# Patient Record
Sex: MALE | Race: WHITE
[De-identification: names, ages, dates, MRNs, and addresses within clinical notes are randomized per-mention and may not be internally consistent; named-entity substitution may affect disease eponyms.]

---

## 2017-05-18 NOTE — CR
EXAMINATION: Right elbow

 

HISTORY: Pain

 

COMPARISON: None

 

TECHNIQUE: 3 views

 

FINDINGS/IMPRESSION: There is no acute osseous abnormality, dislocation, or fracture identified. Bon
e mineralization and joint spaces appear normal. No soft tissue swelling or joint effusion.

## 2021-06-28 ENCOUNTER — HOSPITAL ENCOUNTER (EMERGENCY)
Dept: HOSPITAL 56 - MW.ED | Age: 51
Discharge: HOME | End: 2021-06-28
Payer: COMMERCIAL

## 2021-06-28 DIAGNOSIS — K64.4: Primary | ICD-10-CM

## 2021-06-28 PROCEDURE — 96372 THER/PROPH/DIAG INJ SC/IM: CPT

## 2021-06-28 PROCEDURE — 99282 EMERGENCY DEPT VISIT SF MDM: CPT

## 2021-06-28 NOTE — EDM.PDOC
ED HPI GENERAL MEDICAL PROBLEM





- General


Chief Complaint: Gastrointestinal Problem


Stated Complaint: HEMORRHOID


Time Seen by Provider: 06/28/21 08:42





- History of Present Illness


INITIAL COMMENTS - FREE TEXT/NARRATIVE: 





HISTORY AND PHYSICAL:





History of present illness:


This is a 51-year-old gentleman with no significant past medical history who 

presents ER today secondary to hemorrhoidal pain that started in the middle the 

night.  Patient reports that yesterday evening while he was taking a shower he 

felt his hemorrhoids popping out.  He reports he was able to self reduce it but 

they came back out again.  Patient reports that he woke up in the middle night 

in severe excruciating pain secondary to his hemorrhoids.  Patient denies any 

recent fevers, shakes, chills, nausea, vomiting, diarrhea, dysuria, frequency, 

urgency.  Patient reports he has had hemorrhoids in the past but never have had 

them where they have hurt this bad.  Patient reports he is never seek surgical 

attention for them.  Patient denies any anal trauma, constipation, history of 

liver disease or alcohol abuse.  Patient reports he had normal bowel movements. 

Patient reports normal diet.  Patient reports he has not been straining or doing

any heavy lifting.  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

do significant sitting.  Patient denies any other bleeding or problems.





Review of systems: 


As per history of present illness and below otherwise all systems reviewed and 

negative.





Past medical history: 


As per history of present illness and as reviewed below otherwise 

noncontributory.





Surgical history: 


As per history of present illness and as reviewed below otherwise 

noncontributory.





Social history: 


No reported history of drug abuse.





Family history: 


As per history of present illness and as reviewed below otherwise 

noncontributory.





Physical exam:





This patient was seen and evaluated during the 2020 SARS-CoV-2 novel coronavirus

pandemic period.  Community viral transmission is ongoing at time of this 

encounter and the emergency department is operating under pandemic response 

procedures.





Constitutional: Patient is oriented to person, place, and time.  Appears well-

developed and well-nourished.  No distress.


 HEENT: Moist mucous membranes


 Head: Normocephalic and atraumatic


 Eyes: Right eye exhibits no discharge.  Left eye exhibits no discharge.  No 

scleral icterus


 Neck: Normal range of motion.  No tracheal deviation present.


 Cardiovascular: Normal rate and regular rhythm.


 Pulmonary: Effort normal, no respiratory distress.


 Abdominal: No distention


 Musculoskeletal: Normal range of motion


 Neurologic: Alert and oriented to person, place and time.


 Skin: Pink, warm and dry.  


 Psychiatric: Normal mood and affect.  Behavior is normal.  Judgment and thought

content normal.


 Nursing note and vital signs have been reviewed





Patient's ER physical exam is significant for a large nonthrombosed hemorrhoid 

at the 9 o'clock position.  Extremely tender to palpation but easily reduced in 

the ED.  Despite reduction in the ED, with any kind of coughing or straining, 

the patient reports that his hemorrhoid pops right back out again.  I have 

reduced the hemorrhoid multiple times in the ED for him and applied Anusol HC 

cream in the ED.  





Diagnostics:


[]





Therapeutics:


[]





Assessment and plan:


51-year-old gentleman who presents ER today with a nonthrombosed external 

hemorrhoid that is tender to palpation that is easily reduced but prolapses back

 out with any straining in the ED.  Anusol HC cream was applied to the 

hemorrhoid.  Patient does feel better in the ED after reduction.  It is easily 

reducible at this time.  Patient was given Dilaudid 1 mg IM to assist with pain 

and discomfort.  Patient will be discharged home and will be assisted with 

obtaining a surgical referral with our general surgeon on-call for definitive 

management of his external hemorrhoid.  There is no active bleeding at this 

time.  I have instructed the patient regarding high-fiber diet over the next 

several days as well as prescription for pain medicines.  Patient will also be 

given a prescription for Anusol suppositories to assist with inflammation.





Reassessment at the time of disposition demonstrates that the patient is in no 

acute distress.  The patient has remained stable throughout the entire ED visit 

and is without objective evidence for acute process requiring urgent 

intervention or hospitalization. The patient is stable for discharge, counseling

 is provided as documented above, discussed symptomatic treatment and specific 

conditions for return.





I have spoken with the patient/caregiver and discussed todays findings, in 

addition to providing specific details for the plan of care. Questions are 

answered and there is agreement with the plan.





Definitive disposition and diagnosis as appropriate pending reevaluation and 

review of above.








  ** rectal


Pain Score (Numeric/FACES): 10





- Related Data


                                    Allergies











Allergy/AdvReac Type Severity Reaction Status Date / Time


 


No Known Allergies Allergy   Verified 06/28/21 07:45











Home Meds: 


                                    Home Meds





Hydrocodone/Acetaminophen [Hydrocodone-Acetamin 5-325 mg] 1 each PO Q6HR PRN #14

tab 06/28/21 [Rx]


Hydrocortisone Acetate [Anusol-Hc] 25 mg RC TID PRN #12 supp.rect 06/28/21 [Rx]


Ibuprofen 600 mg PO Q6HR PRN #30 tablet 06/28/21 [Rx]


buPROPion HCL [Bupropion Xl] 300 mg PO DAILY 06/28/21 [History]











Past Medical History





- Past Health History


Medical/Surgical History: Denies Medical/Surgical History





- Infectious Disease History


Infectious Disease History: Reports: Chicken Pox





Social & Family History





- Recreational Drug Use


Recreational Drug Use: No





ED ROS GENERAL





- Review of Systems


Review Of Systems: See Below





ED EXAM, GENERAL





- Physical Exam


Exam: See Below





Course





- Vital Signs


Last Recorded V/S: 





                                Last Vital Signs











Temp  96.6 F L  06/28/21 07:45


 


Pulse  77   06/28/21 07:45


 


Resp      


 


BP  145/87 H  06/28/21 07:45


 


Pulse Ox  97   06/28/21 07:45














- Orders/Labs/Meds


Meds: 





Medications














Discontinued Medications














Generic Name Dose Route Start Last Admin





  Trade Name Freq  PRN Reason Stop Dose Admin


 


Hydrocortisone  0 gm  06/28/21 08:22  06/28/21 08:45





  Hydrocortisone 2.5% Crm 30 Gm Tube  TOP  06/28/21 08:23  1 gm





  ONETIME STA   Administration


 


Hydromorphone HCl  1 mg  06/28/21 08:57  06/28/21 09:11





  Hydromorphone 1 Mg/Ml Syringe  IM  06/28/21 08:58  1 mg





  ONETIME ONE   Administration


 


Ondansetron HCl  4 mg  06/28/21 08:57  06/28/21 09:11





  Ondansetron 4 Mg Tab.Dis  PO  06/28/21 08:58  4 mg





  ONETIME ONE   Administration














Departure





- Departure


Time of Disposition: 10:00


Disposition: Home, Self-Care 01


Condition: Good


Clinical Impression: 


 Hemorrhoids, external








- Discharge Information


Instructions:  Surgical Procedures for Hemorrhoids, Hemorrhoids


Referrals: 


Chitra Prakash MD [Primary Care Provider] - 


Additional Instructions: 


You were seen and evaluated in ER today secondary to your hemorrhoids.  Your 

hemorrhoids are easily reduced with applying constant gentle pressure.  Please 

continue to apply gentle pressure whenever your hemorrhoid should pop out.  You 

will be given steroid cream to take as well as a  prescription for steroid 

suppositories to use when the cream is out.  Please make sure that you drink 

plenty of liquids and eat plenty of fiber so that you are not constipated.  

Avoid utilizing the toilet for a long period of time.  You will assist you with 

obtaining a referral to a surgeon to see you to assist you with your hemorrhoid.

 You were also given a prescription for ibuprofen as well as Norco to help you 

with your pain.  As we discussed, Norco will cause constipation so make sure you

drink plenty of fluids, eat plenty of fruits and vegetables such as watermelon 

and grapes when you are utilizing Norco to help your stools stay loose so you 

are not straining.  Please avoid working with any heavy equipment while you are 

using [place].








[facility] - General Surgery


[address], [address]


[place] [address]


Phone: [phone]











The following information is given to patients seen in the emergency department 

who are being discharged to home. This information is to outline your options 

for follow-up care. We provide all patients seen in our emergency department 

with a follow-up referral.





The need for follow-up, as well as the timing and circumstances, are variable 

depending upon the specifics of your emergency department visit.





If you don't have a primary care physician on staff, we will provide you with a 

referral. We always advise you to contact your personal physician following an 

emergency department visit to inform them of the circumstance of the visit and 

for follow-up with them and/or the need for any referrals to a consulting 

specialist.





The emergency department will also refer you to a specialist when appropriate. 

This referral assures that you have the opportunity for follow-up care with a 

specialist. All of these measure are taken in an effort to provide you with 

optimal care, which includes your follow-up.





Under all circumstances we always encourage you to contact your private 

physician who remains a resource for coordinating your care. When calling for 

follow-up care, please make the office aware that this follow-up is from your 

recent emergency room visit. If for any reason you are refused follow-up, please

contact the [facility] Emergency Department

at [phone] and asked to speak to the emergency department charge nurse.





[facility] - Primary Care


[address] [place] [address]


Phone: [phone]


Fax: [phone]








[facility]


[address] [place] [address]


Phone: [phone]


Fax: [phone]








Sepsis Event Note (ED)





- Evaluation


Sepsis Screening Result: No Definite Risk





- Focused Exam


Vital Signs: 





                                   Vital Signs











  Temp Pulse BP Pulse Ox


 


 06/28/21 07:45  96.6 F L  77  145/87 H  97